# Patient Record
Sex: FEMALE
[De-identification: names, ages, dates, MRNs, and addresses within clinical notes are randomized per-mention and may not be internally consistent; named-entity substitution may affect disease eponyms.]

---

## 2020-03-26 ENCOUNTER — CARE COORDINATION (OUTPATIENT)
Dept: OTHER | Facility: CLINIC | Age: 40
End: 2020-03-26

## 2020-03-31 ENCOUNTER — CARE COORDINATION (OUTPATIENT)
Dept: OTHER | Facility: CLINIC | Age: 40
End: 2020-03-31

## 2020-04-06 ENCOUNTER — CARE COORDINATION (OUTPATIENT)
Dept: OTHER | Facility: CLINIC | Age: 40
End: 2020-04-06

## 2020-04-08 ENCOUNTER — CARE COORDINATION (OUTPATIENT)
Dept: OTHER | Facility: CLINIC | Age: 40
End: 2020-04-08

## 2020-04-08 NOTE — CARE COORDINATION
Ambulatory Care Coordination Note  4/8/2020  CM Risk Score: 0  Charlson 10 Year Mortality Risk Score: 2%     ACC: Dulce Palencia, RN    Summary Note: Pt returned my call from voice message. Pt is currently in [facility] in [address] [place] [address]. Spoke with patient who said her therapy is going excellent, but they do not have a discharge date yet. Pt said she is walking with a rolling walker in therapy only. Pt said overall she is feeling better and is anxious to go home. States her mom will stay with her and her fiance and her 16 yr old daughter to help her out. Number to [facility] rehab is [phone]   Will continue to follow     Kerrie CHAMBERSN, [address] [facility]  [phone]              Prior to Admission medications    Not on File       No future appointments.

## 2020-04-22 ENCOUNTER — CARE COORDINATION (OUTPATIENT)
Dept: OTHER | Facility: CLINIC | Age: 40
End: 2020-04-22

## 2020-04-22 NOTE — CARE COORDINATION
Ambulatory Care Coordination Note  4/22/2020  CM Risk Score: 0  Charlson 10 Year Mortality Risk Score: 2%     ACC: Montse Cosby, RN    Summary Note: ACM attempted to reach patient for follow up call regarding care management . HIPAA compliant message left requesting a return phone call at patients convenience. Will continue to follow. Júnior CONNELL, RN- [facility]   Associate Care Manager  [phone]            Goals Addressed    None         Prior to Admission medications    Not on File       No future appointments.

## 2020-04-23 ENCOUNTER — CARE COORDINATION (OUTPATIENT)
Dept: OTHER | Facility: CLINIC | Age: 40
End: 2020-04-23

## 2020-04-23 SDOH — ECONOMIC STABILITY: FOOD INSECURITY: WITHIN THE PAST 12 MONTHS, YOU WORRIED THAT YOUR FOOD WOULD RUN OUT BEFORE YOU GOT MONEY TO BUY MORE.: NEVER TRUE

## 2020-04-23 SDOH — ECONOMIC STABILITY: TRANSPORTATION INSECURITY
IN THE PAST 12 MONTHS, HAS LACK OF TRANSPORTATION KEPT YOU FROM MEETINGS, WORK, OR FROM GETTING THINGS NEEDED FOR DAILY LIVING?: NO

## 2020-04-23 SDOH — ECONOMIC STABILITY: FOOD INSECURITY: WITHIN THE PAST 12 MONTHS, THE FOOD YOU BOUGHT JUST DIDN'T LAST AND YOU DIDN'T HAVE MONEY TO GET MORE.: NEVER TRUE

## 2020-04-23 SDOH — ECONOMIC STABILITY: TRANSPORTATION INSECURITY
IN THE PAST 12 MONTHS, HAS THE LACK OF TRANSPORTATION KEPT YOU FROM MEDICAL APPOINTMENTS OR FROM GETTING MEDICATIONS?: NO

## 2020-04-23 SDOH — ECONOMIC STABILITY: INCOME INSECURITY: HOW HARD IS IT FOR YOU TO PAY FOR THE VERY BASICS LIKE FOOD, HOUSING, MEDICAL CARE, AND HEATING?: NOT HARD AT ALL

## 2020-05-05 ENCOUNTER — CARE COORDINATION (OUTPATIENT)
Dept: OTHER | Facility: CLINIC | Age: 40
End: 2020-05-05

## 2020-05-19 ENCOUNTER — CARE COORDINATION (OUTPATIENT)
Dept: OTHER | Facility: CLINIC | Age: 40
End: 2020-05-19

## 2020-06-04 ENCOUNTER — CARE COORDINATION (OUTPATIENT)
Dept: OTHER | Facility: CLINIC | Age: 40
End: 2020-06-04

## 2020-06-04 NOTE — CARE COORDINATION
Ambulatory Care Coordination Note  6/4/2020  CM Risk Score: 0  Charlson 10 Year Mortality Risk Score: 2%     ACC: Ame Lundy, SHANTAL    Summary Note: Spoke with patient who said she had surgery  and the bone was replaced in her head. Pt said the drain was pulled and she came home yesterday. Pt has a follow up in 2 weeks she said and she is using a cane to ambulate. Pt said she has pain medication to take and it helps. Pt's outpatient therapy is postponed until her follow up in 2 weeks. Pt has support at home. I am unable to review any information in Epic. Will continue to follow patient         Care Coordination Interventions    Program Enrollment:  Rising Risk  Referral from Primary Care Provider:  No  Suggested Interventions and Community Resources  Occupational Therapy: In Process  Physical Therapy: In Process  Other Services or Interventions:  Pt also has outpatient speech therapy              Prior to Admission medications    Not on File       No future appointments.

## 2020-07-06 ENCOUNTER — CARE COORDINATION (OUTPATIENT)
Dept: OTHER | Facility: CLINIC | Age: 40
End: 2020-07-06

## 2020-07-06 NOTE — CARE COORDINATION
Ambulatory Care Coordination Note  7/6/2020  CM Risk Score: 0  Charlson 10 Year Mortality Risk Score: 2%     ACC: Juanjose Liao RN    Summary Note: [facility] attempted to reach patient for follow up call regarding care coordination HIPAA compliant message left requesting a return phone call at patients convenience. Will continue to follow. Care Coordination Interventions    Program Enrollment:  Rising Risk  Referral from Primary Care Provider:  No  Suggested Interventions and Community Resources  Occupational Therapy: In Process  Physical Therapy: In Process  Other Services or Interventions:  Pt also has outpatient speech therapy          Goals Addressed    None         Prior to Admission medications    Not on File       No future appointments.

## 2020-07-08 ENCOUNTER — CARE COORDINATION (OUTPATIENT)
Dept: OTHER | Facility: CLINIC | Age: 40
End: 2020-07-08

## 2020-07-08 NOTE — LETTER
Dear Gary Murray:      My name is Rocky Taylor , Associate Care Manager for [address] [facility] and I have been trying to reach you. The Associate Care Management (ACM) program is a free-of-charge confidential service provided to our employees and their family members covered by the [address] [facility]. The program will provide an associate and his/her family with the [employer]'s expertise to assist in navigating the health care delivery system, provider services, and their overall care needsso as to assure and improve health care interactions and enhance the quality of life. This program is designed to provide you with the opportunity to have a [facility] partner with you for the following services:     1) when you come home from the hospital or emergency room   2) when help is needed to manage your disease   3) when you need assistance coordinating services or appointments    [address] [place] [address] is dedicated to empowering the good health of its community and improving the quality of care and care experiences for employees and their families. We are committed to safeguarding patient confidentiality and privacy, assuring that every employee has the respect he or she deserves in managing their health. The information shared with your care manager will not be shared with anyone else aside from those you identify as part of your care team, and will only be used to assist you with any identified care needs. Please contact me if you would like this service provided to you. Sincerely,    Rocky CONNELL, RN- [facility]   Associate Care Manager  [phone]      If you have any questions or concerns, please don't hesitate to call.     Sincerely,        John Cruz RN

## 2020-07-13 ENCOUNTER — CARE COORDINATION (OUTPATIENT)
Dept: OTHER | Facility: CLINIC | Age: 40
End: 2020-07-13

## 2020-07-13 NOTE — CARE COORDINATION
Ambulatory Care Coordination Note  7/13/2020  CM Risk Score: 0  Charlson 10 Year Mortality Risk Score: 2%     ACC: Pietro Kim RN    Summary Note: 3rd unsuccessful attempt to follow up with patient,  HIPAA compliant message left requesting a return phone call at patients convenience. UTR letter mailed to patients home . Will follow               Care Coordination Interventions    Program Enrollment:  Rising Risk  Referral from Primary Care Provider:  No  Suggested Interventions and Community Resources  Occupational Therapy: In Process  Physical Therapy: In Process  Other Services or Interventions:  Pt also has outpatient speech therapy          Goals Addressed    None         Prior to Admission medications    Not on File       No future appointments.

## 2020-07-17 ENCOUNTER — CARE COORDINATION (OUTPATIENT)
Dept: OTHER | Facility: CLINIC | Age: 40
End: 2020-07-17

## 2020-07-17 NOTE — CARE COORDINATION
Ambulatory Care Coordination Note  7/17/2020  CM Risk Score: 0  Charlson 10 Year Mortality Risk Score: 2%     ACC: Dhaval Prajapati, SHANTAL    Summary Note: 4th and final attempt to reach patient. Lost to follow letter previously mailed to patient's home. HIPAA compliant message left requesting a return phone. Will sign off if no return call from patient    Azizamichelle Caitlin CONNELL, [address]       [phone]            Care Coordination Interventions    Program Enrollment:  Rising Risk  Referral from Primary Care Provider:  No  Suggested Interventions and Community Resources  Occupational Therapy: In Process  Physical Therapy: In Process  Other Services or Interventions:  Pt also has outpatient speech therapy          Goals Addressed    None         Prior to Admission medications    Not on File       No future appointments.

## 2023-09-22 ENCOUNTER — EMERGENCY (EMERGENCY)
Facility: HOSPITAL | Age: 43
LOS: 1 days | Discharge: HOME | End: 2023-09-22
Attending: STUDENT IN AN ORGANIZED HEALTH CARE EDUCATION/TRAINING PROGRAM
Payer: MEDICARE

## 2023-09-22 VITALS
WEIGHT: 148.15 LBS | HEART RATE: 87 BPM | RESPIRATION RATE: 16 BRPM | SYSTOLIC BLOOD PRESSURE: 141 MMHG | HEIGHT: 64 IN | OXYGEN SATURATION: 97 % | DIASTOLIC BLOOD PRESSURE: 95 MMHG | TEMPERATURE: 98 F

## 2023-09-22 PROCEDURE — 96375 TX/PRO/DX INJ NEW DRUG ADDON: CPT

## 2023-09-22 PROCEDURE — 99053 MED SERV 10PM-8AM 24 HR FAC: CPT

## 2023-09-22 PROCEDURE — 96374 THER/PROPH/DIAG INJ IV PUSH: CPT

## 2023-09-22 PROCEDURE — 99284 EMERGENCY DEPT VISIT MOD MDM: CPT

## 2023-09-22 PROCEDURE — 99284 EMERGENCY DEPT VISIT MOD MDM: CPT | Mod: 25

## 2023-09-22 RX ORDER — SODIUM CHLORIDE 9 MG/ML
1000 INJECTION INTRAMUSCULAR; INTRAVENOUS; SUBCUTANEOUS ONCE
Refills: 0 | Status: COMPLETED | OUTPATIENT
Start: 2023-09-22 | End: 2023-09-22

## 2023-09-22 RX ORDER — DIPHENHYDRAMINE HCL 50 MG
25 CAPSULE ORAL ONCE
Refills: 0 | Status: COMPLETED | OUTPATIENT
Start: 2023-09-22 | End: 2023-09-22

## 2023-09-22 RX ORDER — PROCHLORPERAZINE MALEATE 5 MG
10 TABLET ORAL ONCE
Refills: 0 | Status: COMPLETED | OUTPATIENT
Start: 2023-09-22 | End: 2023-09-22

## 2023-09-22 RX ORDER — DIPHENHYDRAMINE HCL 50 MG
12.5 CAPSULE ORAL ONCE
Refills: 0 | Status: DISCONTINUED | OUTPATIENT
Start: 2023-09-22 | End: 2023-09-22

## 2023-09-22 RX ADMIN — SODIUM CHLORIDE 1000 MILLILITER(S): 9 INJECTION INTRAMUSCULAR; INTRAVENOUS; SUBCUTANEOUS at 03:47

## 2023-09-22 RX ADMIN — Medication 12.5 MILLIGRAM(S): at 03:48

## 2023-09-22 RX ADMIN — Medication 10 MILLIGRAM(S): at 04:14

## 2023-09-22 RX ADMIN — Medication 25 MILLIGRAM(S): at 04:14

## 2023-09-22 NOTE — ED PROVIDER NOTE - ATTENDING CONTRIBUTION TO CARE
43y F w/ hx migraines, prior CVA, breast CA; presents for headache since yesterday. +Nausea and photophobia. Symptoms consistent with prior migraines. On exam, pt in no acute distress, no focal neuro deficits. Treated with compazine with good relief of symptoms. Medically stable for discharge with outpatient neurology f/u.

## 2023-09-22 NOTE — ED PROVIDER NOTE - PATIENT PORTAL LINK FT
You can access the FollowMyHealth Patient Portal offered by [facility] by registering at the following website: [email or web address]. By joining [employer]’s FollowMyHealth portal, you will also be able to view your health information using other applications (apps) compatible with our system.

## 2023-09-22 NOTE — ED PROVIDER NOTE - CLINICAL SUMMARY MEDICAL DECISION MAKING FREE TEXT BOX
43y female w/ pmh of breast cancer, CVA, migraines, and HTN presenting with a headache that began 10 hours ago. She states it feels like one of her migraines but more severe. She has vomited once and has associated left eye pain. Denies any trauma or falls. She denies changes to her vision or speech, focal numbness or weakness, loss of balance, fever, cough, congestion, CP, SOB, abd pain, back pain, N/V/D. She has previously had an adverse reaction to reglan. 43y female w/ pmh of breast cancer, CVA, migraines, and HTN presenting with a headache that began 10 hours ago. She states it feels like one of her migraines but more severe. She has vomited once and has associated left eye pain. Patient well appearing, mild distress, stable vitals, neurovascularly intact. Symptoms improved with compazine and benadryl. Will discharge home with return precautions.

## 2023-09-22 NOTE — ED PROVIDER NOTE - NSFOLLOWUPINSTRUCTIONS_ED_ALL_ED_FT
You are advised to please follow up with your primary care doctor within the next 24 hours and return to the Emergency Department for worsening symptoms or any other concerns.  Your doctor may call [phone] to follow up on the specific results of the tests performed today in the emergency department.     Migraine Headache  A migraine headache is an intense, throbbing pain on one side or both sides of the head. Migraine headaches may also cause other symptoms, such as nausea, vomiting, and sensitivity to light and noise. A migraine headache can last from 4 hours to 3 days. Talk with your doctor about what things may bring on (trigger) your migraine headaches.    What are the causes?  The exact cause of this condition is not known. However, a migraine may be caused when nerves in the brain become irritated and release chemicals that cause inflammation of blood vessels. This inflammation causes pain. This condition may be triggered or caused by:  Drinking alcohol.  Smoking.  Taking medicines, such as:  Medicine used to treat chest pain (nitroglycerin).  Birth control pills.  Estrogen.  Certain blood pressure medicines.  Eating or drinking products that contain nitrates, glutamate, aspartame, or tyramine. Aged cheeses, chocolate, or caffeine may also be triggers.  Doing physical activity.  Other things that may trigger a migraine headache include:  Menstruation.  Pregnancy.  Hunger.  Stress.  Lack of sleep or too much sleep.  Weather changes.  Fatigue.  What increases the risk?  The following factors may make you more likely to experience migraine headaches:  Being a certain age. This condition is more common in people who are 25–55 years old.  Being female.  Having a family history of migraine headaches.  Being .  Having a mental health condition, such as depression or anxiety.  Being obese.  What are the signs or symptoms?  The main symptom of this condition is pulsating or throbbing pain. This pain may:  Happen in any area of the head, such as on one side or both sides.  Interfere with daily activities.  Get worse with physical activity.  Get worse with exposure to bright lights or loud noises.  Other symptoms may include:  Nausea.  Vomiting.  Dizziness.  General sensitivity to bright lights, loud noises, or smells.  Before you get a migraine headache, you may get warning signs (an aura). An aura may include:  Seeing flashing lights or having blind spots.  Seeing bright spots, halos, or zigzag lines.  Having tunnel vision or blurred vision.  Having numbness or a tingling feeling.  Having trouble talking.  Having muscle weakness.  Some people have symptoms after a migraine headache (postdromal phase), such as:  Feeling tired.  Difficulty concentrating.  How is this diagnosed?  A migraine headache can be diagnosed based on:  Your symptoms.  A physical exam.  Tests, such as:  CT scan or an MRI of the head. These imaging tests can help rule out other causes of headaches.  Taking fluid from the spine (lumbar puncture) and analyzing it (cerebrospinal fluid analysis, or CSF analysis).  How is this treated?  This condition may be treated with medicines that:  Relieve pain.  Relieve nausea.  Prevent migraine headaches.  Treatment for this condition may also include:  Acupuncture.  Lifestyle changes like avoiding foods that trigger migraine headaches.  Biofeedback.  Cognitive behavioral therapy.  Follow these instructions at home:  Medicines    Take over-the-counter and prescription medicines only as told by your health care provider.  Ask your health care provider if the medicine prescribed to you:  Requires you to avoid driving or using heavy machinery.  Can cause constipation. You may need to take these actions to prevent or treat constipation:  Drink enough fluid to keep your urine pale yellow.  Take over-the-counter or prescription medicines.  Eat foods that are high in fiber, such as beans, whole grains, and fresh fruits and vegetables.  Limit foods that are high in fat and processed sugars, such as fried or sweet foods.  Lifestyle    Do not drink alcohol.  Do not use any products that contain nicotine or tobacco, such as cigarettes, e-cigarettes, and chewing tobacco. If you need help quitting, ask your health care provider.  Get at least 8 hours of sleep every night.  Find ways to manage stress, such as meditation, deep breathing, or yoga.  General instructions    Keep a journal to find out what may trigger your migraine headaches. For example, write down:  What you eat and drink.  How much sleep you get.  Any change to your diet or medicines.  If you have a migraine headache:  Avoid things that make your symptoms worse, such as bright lights.  It may help to lie down in a dark, quiet room.  Do not drive or use heavy machinery.  Ask your health care provider what activities are safe for you while you are experiencing symptoms.  Keep all follow-up visits as told by your health care provider. This is important.  Contact a health care provider if:  You develop symptoms that are different or more severe than your usual migraine headache symptoms.  You have more than 15 headache days in one month.  Get help right away if:  Your migraine headache becomes severe.  Your migraine headache lasts longer than 72 hours.  You have a fever.  You have a stiff neck.  You have vision loss.  Your muscles feel weak or like you cannot control them.  You start to lose your balance often.  You have trouble walking.  You faint.  You have a seizure.  Summary  A migraine headache is an intense, throbbing pain on one side or both sides of the head. Migraines may also cause other symptoms, such as nausea, vomiting, and sensitivity to light and noise.  This condition may be treated with medicines and lifestyle changes. You may also need to avoid certain things that trigger a migraine headache.  Keep a journal to find out what may trigger your migraine headaches.  Contact your health care provider if you have more than 15 headache days in a month or you develop symptoms that are different or more severe than your usual migraine headache symptoms.  This information is not intended to replace advice given to you by your health care provider. Make sure you discuss any questions you have with your health care provider.

## 2023-09-22 NOTE — ED ADULT NURSE NOTE - NSFALLUNIVINTERV_ED_ALL_ED
Bed/Stretcher in lowest position, wheels locked, appropriate side rails in place/Call bell, personal items and telephone in reach/Instruct patient to call for assistance before getting out of bed/chair/stretcher/Non-slip footwear applied when patient is off stretcher/[place] to call system/Physically safe environment - no spills, clutter or unnecessary equipment/Purposeful proactive rounding/Room/bathroom lighting operational, light cord in reach

## 2023-09-22 NOTE — ED ADULT NURSE NOTE - AS SC BRADEN FRICTION
Consider switching to [facility] mail order to have medications sent directly to your home: Call [phone]  
(3) no apparent problem

## 2023-09-22 NOTE — ED PROVIDER NOTE - OBJECTIVE STATEMENT
43y female w/ pmh of breast cancer, CVA, migraines, and HTN presenting with a headache that began 10 hours ago. She states it feels like one of her migraines but more severe. She has vomited once and has associated left eye pain. Denies any trauma or falls. She denies changes to her vision or speech, focal numbness or weakness, loss of balance, fever, cough, congestion, CP, SOB, abd pain, back pain, N/V/D. She has previously had an adverse reaction to reglan.

## 2023-09-22 NOTE — ED PROVIDER NOTE - NS ED ROS FT
General: Denies fever, chills  HEENT: Denies sore throat  Neck: Denies neck pain  Resp: Denies coughing, SOB  Cardiovascular: Denies CP, palpitations, LE edema  GI: Denies abdominal pain, diarrhea, constipation, blood in stool  : Denies dysuria, hematuria  MSK: Denies back pain  Neuro: Denies dizziness, numbness, weakness  Skin: Denies rashes.

## 2023-09-22 NOTE — ED ADULT TRIAGE NOTE - CHIEF COMPLAINT QUOTE
pt. c/o migraines frontal and top of head since yesterday w/photophobia, pain 8/10.  Hx. of brain sgy post stroke, breast Ca w/lymph nodes removed left side, migraines. not on thinners.